# Patient Record
Sex: FEMALE | Race: ASIAN | Employment: OTHER | URBAN - METROPOLITAN AREA
[De-identification: names, ages, dates, MRNs, and addresses within clinical notes are randomized per-mention and may not be internally consistent; named-entity substitution may affect disease eponyms.]

---

## 2017-09-28 ENCOUNTER — HOSPITAL ENCOUNTER (OUTPATIENT)
Dept: CT IMAGING | Age: 72
Discharge: HOME | End: 2017-09-28
Attending: UROLOGY
Payer: MEDICARE

## 2017-09-28 DIAGNOSIS — R10.32 LEFT LOWER QUADRANT PAIN: ICD-10-CM

## 2017-09-28 PROCEDURE — 74176 CT ABD & PELVIS W/O CONTRAST: CPT

## 2025-04-10 ENCOUNTER — OFFICE VISIT (OUTPATIENT)
Age: 80
End: 2025-04-10
Payer: MEDICARE

## 2025-04-10 ENCOUNTER — OFFICE VISIT (OUTPATIENT)
Dept: ENT CLINIC | Age: 80
End: 2025-04-10
Payer: MEDICARE

## 2025-04-10 VITALS
HEIGHT: 59 IN | WEIGHT: 111.5 LBS | OXYGEN SATURATION: 100 % | HEART RATE: 61 BPM | BODY MASS INDEX: 22.48 KG/M2 | RESPIRATION RATE: 18 BRPM

## 2025-04-10 DIAGNOSIS — H90.3 SENSORINEURAL HEARING LOSS, BILATERAL: Primary | ICD-10-CM

## 2025-04-10 DIAGNOSIS — H92.01 OTALGIA, RIGHT: ICD-10-CM

## 2025-04-10 DIAGNOSIS — H90.3 SENSORINEURAL HEARING LOSS (SNHL) OF BOTH EARS: Primary | ICD-10-CM

## 2025-04-10 PROCEDURE — 92557 COMPREHENSIVE HEARING TEST: CPT | Performed by: AUDIOLOGIST

## 2025-04-10 PROCEDURE — 1090F PRES/ABSN URINE INCON ASSESS: CPT | Performed by: STUDENT IN AN ORGANIZED HEALTH CARE EDUCATION/TRAINING PROGRAM

## 2025-04-10 PROCEDURE — 99203 OFFICE O/P NEW LOW 30 MIN: CPT | Performed by: STUDENT IN AN ORGANIZED HEALTH CARE EDUCATION/TRAINING PROGRAM

## 2025-04-10 PROCEDURE — G8399 PT W/DXA RESULTS DOCUMENT: HCPCS | Performed by: STUDENT IN AN ORGANIZED HEALTH CARE EDUCATION/TRAINING PROGRAM

## 2025-04-10 PROCEDURE — 1036F TOBACCO NON-USER: CPT | Performed by: STUDENT IN AN ORGANIZED HEALTH CARE EDUCATION/TRAINING PROGRAM

## 2025-04-10 PROCEDURE — 1160F RVW MEDS BY RX/DR IN RCRD: CPT | Performed by: STUDENT IN AN ORGANIZED HEALTH CARE EDUCATION/TRAINING PROGRAM

## 2025-04-10 PROCEDURE — 1123F ACP DISCUSS/DSCN MKR DOCD: CPT | Performed by: STUDENT IN AN ORGANIZED HEALTH CARE EDUCATION/TRAINING PROGRAM

## 2025-04-10 PROCEDURE — G8420 CALC BMI NORM PARAMETERS: HCPCS | Performed by: STUDENT IN AN ORGANIZED HEALTH CARE EDUCATION/TRAINING PROGRAM

## 2025-04-10 PROCEDURE — G8427 DOCREV CUR MEDS BY ELIG CLIN: HCPCS | Performed by: STUDENT IN AN ORGANIZED HEALTH CARE EDUCATION/TRAINING PROGRAM

## 2025-04-10 PROCEDURE — 92567 TYMPANOMETRY: CPT | Performed by: AUDIOLOGIST

## 2025-04-10 PROCEDURE — 1159F MED LIST DOCD IN RCRD: CPT | Performed by: STUDENT IN AN ORGANIZED HEALTH CARE EDUCATION/TRAINING PROGRAM

## 2025-04-10 ASSESSMENT — ENCOUNTER SYMPTOMS
EYE ITCHING: 0
WHEEZING: 0
SHORTNESS OF BREATH: 0
APNEA: 0
SINUS PAIN: 0
COUGH: 0
FACIAL SWELLING: 0
CONSTIPATION: 0
STRIDOR: 0
DIARRHEA: 0
SINUS PRESSURE: 0
NAUSEA: 0
EYE DISCHARGE: 0
CHOKING: 0
EYE PAIN: 0

## 2025-04-10 NOTE — PROGRESS NOTES
HPI:    Leti Zuniga is a 79 y.o. female seen New    Chief Complaint   Patient presents with    Hearing Problem     Patient presents today with L ear hearing decrease and R ear pain .        History of Present Illness  79-year-old female presents for new patient referral evaluation for hearing loss and ear pain. Here for hearing testing and ENT evaluation.    Underwent several audiometric evaluations over the past 5 years, consistently indicating mild bilateral hearing impairment. Reports intermittent right-sided otalgia, not severe. No history of otitis media.    Recently experienced episodes of disequilibrium, particularly when ascending or descending stairs, perceiving objects as moving. Symptoms may be stress-related, worsened since 's passing 9 months ago.        Past Medical History, Past Surgical History, Family history, Social History, and Medications were all reviewed with the patient today and updated as necessary.     Allergies   Allergen Reactions    Bee Venom Hives     Any insect bite causes hives;  Pt keeps epi pen with her.    Fire Ant Anaphylaxis    Other Hives     Any insect bite causes hives;  Pt keeps epi pen with her.    Shellfish Allergy Hives     Other Reaction(s): Anaphylactic shock-Allergy    Shrimp and crab causes hives    Milk (Cow)      Other Reaction(s): Nausea and/or vomiting-Intolerance    GI upset    Sulfamethoxazole-Trimethoprim      Other Reaction(s): Itching-Allergy    Lidocaine      Other Reaction(s): Nausea and/or vomiting-Intolerance    6-10-22 pt here for bx; pt states she has \"n/v for several hours after receiving Lidocaine\"; pt was given 2% Polocaine instead; post bx call was made and pt denied any symptoms with Polocaine.       Patient Active Problem List   Diagnosis    Acquired hypothyroidism    Pure hypercholesterolemia    Insomnia       Current Outpatient Medications   Medication Sig    EPINEPHrine (EPIPEN) 0.3 MG/0.3ML SOAJ injection Inject 0.3 mLs into the muscle